# Patient Record
Sex: MALE | Race: ASIAN
[De-identification: names, ages, dates, MRNs, and addresses within clinical notes are randomized per-mention and may not be internally consistent; named-entity substitution may affect disease eponyms.]

---

## 2020-01-03 ENCOUNTER — HOSPITAL ENCOUNTER (EMERGENCY)
Dept: HOSPITAL 12 - ER | Age: 50
Discharge: HOME | End: 2020-01-03
Payer: COMMERCIAL

## 2020-01-03 VITALS — SYSTOLIC BLOOD PRESSURE: 128 MMHG | DIASTOLIC BLOOD PRESSURE: 82 MMHG

## 2020-01-03 VITALS — WEIGHT: 179 LBS | HEIGHT: 72 IN | BODY MASS INDEX: 24.24 KG/M2

## 2020-01-03 DIAGNOSIS — R20.2: Primary | ICD-10-CM

## 2020-01-03 LAB
ALP SERPL-CCNC: 93 U/L (ref 50–136)
ALT SERPL W/O P-5'-P-CCNC: 24 U/L (ref 16–63)
AST SERPL-CCNC: 12 U/L (ref 15–37)
BASOPHILS # BLD AUTO: 0 K/UL (ref 0–8)
BASOPHILS NFR BLD AUTO: 0.3 % (ref 0–2)
BILIRUB DIRECT SERPL-MCNC: 0.2 MG/DL (ref 0–0.2)
BILIRUB SERPL-MCNC: 0.6 MG/DL (ref 0.2–1)
BUN SERPL-MCNC: 12 MG/DL (ref 7–18)
CHLORIDE SERPL-SCNC: 103 MMOL/L (ref 98–107)
CO2 SERPL-SCNC: 27 MMOL/L (ref 21–32)
CREAT SERPL-MCNC: 0.9 MG/DL (ref 0.6–1.3)
EOSINOPHIL # BLD AUTO: 0.1 K/UL (ref 0–0.7)
EOSINOPHIL NFR BLD AUTO: 1.1 % (ref 0–7)
GLUCOSE SERPL-MCNC: 98 MG/DL (ref 74–106)
HCT VFR BLD AUTO: 43.5 % (ref 36.7–47.1)
HGB BLD-MCNC: 14.5 G/DL (ref 12.5–16.3)
LYMPHOCYTES # BLD AUTO: 1.6 K/UL (ref 20–40)
LYMPHOCYTES NFR BLD AUTO: 28.5 % (ref 20.5–51.5)
MCH RBC QN AUTO: 31.5 UUG (ref 23.8–33.4)
MCHC RBC AUTO-ENTMCNC: 33 G/DL (ref 32.5–36.3)
MCV RBC AUTO: 94.7 FL (ref 73–96.2)
MONOCYTES # BLD AUTO: 0.2 K/UL (ref 2–10)
MONOCYTES NFR BLD AUTO: 4.2 % (ref 0–11)
NEUTROPHILS # BLD AUTO: 3.7 K/UL (ref 1.8–8.9)
NEUTROPHILS NFR BLD AUTO: 65.9 % (ref 38.5–71.5)
PLATELET # BLD AUTO: 230 K/UL (ref 152–348)
POTASSIUM SERPL-SCNC: 4.2 MMOL/L (ref 3.5–5.1)
RBC # BLD AUTO: 4.59 MIL/UL (ref 4.06–5.63)
WBC # BLD AUTO: 5.6 K/UL (ref 3.6–10.2)
WS STN SPEC: 7.2 G/DL (ref 6.4–8.2)

## 2020-01-03 PROCEDURE — A4663 DIALYSIS BLOOD PRESSURE CUFF: HCPCS

## 2020-01-03 NOTE — NUR
Patient discharged to home in stable conditon.  Written and verbal after care 
instructions given. 

Patient verbalizes understanding of instructions. Provided patient with copy of 
all tests. Patient ambulated with stable gait.

## 2020-01-03 NOTE — NUR
Patient ambulated with stable gait. Speech is clear, speaks in complete 
sentences. A/Ox4. No acute neuro deficits. Patient came for c/o left sided UE 
numbness since 0130. Patient stated he was able to sleep through it. 
Respiratory even and unlabored, no cough no sob at this time even though he 
reported being short of breath. No acute cardiovascular distress noted, all 
pulses palpable. Denies any n/v/d.



Patient in bed at lowest position, sr upx2, call light within reach. Fall 
precautions implemented per protocol. Patient connected to monitor.

## 2021-12-25 ENCOUNTER — HOSPITAL ENCOUNTER (EMERGENCY)
Dept: HOSPITAL 12 - ER | Age: 51
Discharge: HOME | End: 2021-12-25
Payer: COMMERCIAL

## 2021-12-25 VITALS — WEIGHT: 202 LBS | HEIGHT: 72 IN | BODY MASS INDEX: 27.36 KG/M2

## 2021-12-25 VITALS — DIASTOLIC BLOOD PRESSURE: 77 MMHG | SYSTOLIC BLOOD PRESSURE: 131 MMHG

## 2021-12-25 DIAGNOSIS — Z20.822: ICD-10-CM

## 2021-12-25 DIAGNOSIS — F41.9: Primary | ICD-10-CM

## 2021-12-25 DIAGNOSIS — R03.0: ICD-10-CM

## 2021-12-25 LAB
ALP SERPL-CCNC: 84 U/L (ref 50–136)
ALT SERPL W/O P-5'-P-CCNC: 22 U/L (ref 16–63)
AST SERPL-CCNC: 15 U/L (ref 15–37)
BILIRUB DIRECT SERPL-MCNC: 0.1 MG/DL (ref 0–0.2)
BILIRUB SERPL-MCNC: 0.6 MG/DL (ref 0.2–1)
BUN SERPL-MCNC: 16 MG/DL (ref 7–18)
CHLORIDE SERPL-SCNC: 105 MMOL/L (ref 98–107)
CO2 SERPL-SCNC: 23 MMOL/L (ref 21–32)
CREAT SERPL-MCNC: 0.9 MG/DL (ref 0.6–1.3)
GLUCOSE SERPL-MCNC: 107 MG/DL (ref 74–106)
HCT VFR BLD AUTO: 45.5 % (ref 36.7–47.1)
MCH RBC QN AUTO: 31.1 UUG (ref 23.8–33.4)
MCV RBC AUTO: 92.9 FL (ref 73–96.2)
PLATELET # BLD AUTO: 240 K/UL (ref 152–348)
POTASSIUM SERPL-SCNC: 4 MMOL/L (ref 3.5–5.1)
WS STN SPEC: 7.7 G/DL (ref 6.4–8.2)

## 2021-12-25 PROCEDURE — A4663 DIALYSIS BLOOD PRESSURE CUFF: HCPCS

## 2021-12-25 NOTE — NUR
Patient discharged to home in stable condition.  Written and verbal after care 
instructions given. 

Patient verbalizes understanding of instructions. Stressed follow up or return 
to ER for worsening s/s.pt says feels better, ready to go home.